# Patient Record
Sex: FEMALE
[De-identification: names, ages, dates, MRNs, and addresses within clinical notes are randomized per-mention and may not be internally consistent; named-entity substitution may affect disease eponyms.]

---

## 2022-11-14 ENCOUNTER — HOSPITAL ENCOUNTER (EMERGENCY)
Dept: HOSPITAL 97 - ER | Age: 52
Discharge: HOME | End: 2022-11-14
Payer: COMMERCIAL

## 2022-11-14 VITALS — TEMPERATURE: 98.4 F

## 2022-11-14 VITALS — OXYGEN SATURATION: 99 %

## 2022-11-14 VITALS — DIASTOLIC BLOOD PRESSURE: 75 MMHG | SYSTOLIC BLOOD PRESSURE: 109 MMHG

## 2022-11-14 DIAGNOSIS — R07.89: Primary | ICD-10-CM

## 2022-11-14 DIAGNOSIS — R07.0: ICD-10-CM

## 2022-11-14 LAB
BUN BLD-MCNC: 5 MG/DL (ref 7–18)
GLUCOSE SERPLBLD-MCNC: 100 MG/DL (ref 74–106)
HCT VFR BLD CALC: 38.4 % (ref 36–45)
LYMPHOCYTES # SPEC AUTO: 0.9 K/UL (ref 0.7–4.9)
MAGNESIUM SERPL-MCNC: 2.2 MG/DL (ref 1.8–2.4)
MCV RBC: 91.7 FL (ref 80–100)
PMV BLD: 6.6 FL (ref 7.6–11.3)
POTASSIUM SERPL-SCNC: 3.3 MMOL/L (ref 3.5–5.1)
RBC # BLD: 4.19 M/UL (ref 3.86–4.86)
TROPONIN I SERPL HS-MCNC: 3.9 PG/ML (ref ?–58.9)

## 2022-11-14 PROCEDURE — 85025 COMPLETE CBC W/AUTO DIFF WBC: CPT

## 2022-11-14 PROCEDURE — 71045 X-RAY EXAM CHEST 1 VIEW: CPT

## 2022-11-14 PROCEDURE — 36415 COLL VENOUS BLD VENIPUNCTURE: CPT

## 2022-11-14 PROCEDURE — 93005 ELECTROCARDIOGRAM TRACING: CPT

## 2022-11-14 PROCEDURE — 83735 ASSAY OF MAGNESIUM: CPT

## 2022-11-14 PROCEDURE — 99284 EMERGENCY DEPT VISIT MOD MDM: CPT

## 2022-11-14 PROCEDURE — 80048 BASIC METABOLIC PNL TOTAL CA: CPT

## 2022-11-14 PROCEDURE — 84484 ASSAY OF TROPONIN QUANT: CPT

## 2022-11-14 PROCEDURE — 85379 FIBRIN DEGRADATION QUANT: CPT

## 2022-11-14 PROCEDURE — 71275 CT ANGIOGRAPHY CHEST: CPT

## 2022-11-14 NOTE — RAD REPORT
EXAM DESCRIPTION:  CT - Chest For Pe Angio - 11/14/2022 7:30 am

 

CLINICAL HISTORY:  Chest pain, SOB;   [facility]

 

TECHNIQUE:  CT angiogram of the chest using intravenous contrast. MIP reconstructions were performed.


All CT scans at this facility use dose modulation, iterative reconstruction, and/or weight based dosi
ng when appropriate to reduce radiation dose to as low as reasonably achievable.

 

COMPARISON:  None.

 

FINDINGS:  PULMONARY ARTERIES:   The pulmonary arteries are adequately opacified to the subsegmental 
level.   No intraluminal filling defects are identified to suggest acute PE.

MEDIASTINUM:   The heart and great vessels appear unremarkable. There is no evidence for pericardial 
effusion. There is no pathologically enlarged adenopathy.

LUNGS:   There are linear bands of atelectasis at the bilateral lung bases. The lungs are otherwise c
lear bilaterally. No focal consolidation identified. No dominant pulmonary nodules or masses are iden
tified. No pleural effusion or pneumothorax.

VISUALIZED ABDOMEN:   The visualized solid organs of the abdomen reveal no acute process. The liver a
ppears low in density raising concern for fatty infiltration.

BONES AND SOFT TISSUES:   The soft tissues and osseous structures appear unremarkable.

 

IMPRESSION:  1.   No CT evidence of pulmonary embolism.   No other acute cardiopulmonary process iden
tified.

2.   Atelectatic changes are present at the bilateral lung bases.

3.   Low-density appearance of the liver is concerning for mild fatty infiltration.

 

Electronically signed by:   Rufino Mancuso MD   11/14/2022 6:29 AM CST Workstation: [id number]

 

 

 

 

Due to temporary technical issues with the PACS/Fluency reporting system, reports are being signed by
 the in house radiologists without review as a courtesy to insure prompt reporting. The interpreting 
radiologist is fully responsible for the content of the report.

## 2022-11-14 NOTE — EDPHYS
Physician Documentation                                                                           

 [facility]                                                                 

Name: Adriane Brady                                                                            

Age: 52 yrs                                                                                       

Sex: Female                                                                                       

: 1970                                                                                   

MRN: [id number]                                                                                   

Arrival Date: 2022                                                                          

Time: 04:08                                                                                       

Account#: [id number]                                                                            

Bed 5                                                                                             

Private MD:                                                                                       

ED Physician Kp Hawthorne                                                                         

HPI:                                                                                              

                                                                                             

07:37 This 52 yrs old  Female presents to ER via Ambulatory with complaints of Chest  ms3 

      Pain > 31 y/o.                                                                              

07:37 This 52 yrs old  Female presents to ER via Ambulatory with complaints of Chest  ms3 

      Pain > 33 y/o.                                                                              

07:37 52-year-old female with past medical history of lupus presents for throat pain and      ms3 

      fever that has been ongoing for 9 days. Patient states yesterday she developed chest        

      pain that is described as pressure and rated a 6/10. Patient denies alleviating or          

      inciting factors. Patient notes she has completed a course of amoxicillin for her sore      

      throat..                                                                                    

                                                                                                  

Historical:                                                                                       

- Allergies:                                                                                      

04:31 No Known Allergies;                                                                     kl  

- Home Meds:                                                                                      

04:31 hydroxychloroquine 200 mg oral tab 1 tab 2 times per day [Active]; prednisone 5 mg/mL   kl  

      Oral conc once daily [Active];                                                              

- PMHx:                                                                                           

04:32 Lupus erythematosus;                                                                    kl  

- PSHx:                                                                                           

04:32 None;                                                                                   kl  

                                                                                                  

- Immunization history:: Adult Immunizations not up to date.                                      

- Social history:: Smoking status: Patient denies any tobacco usage or history of.                

                                                                                                  

                                                                                                  

ROS:                                                                                              

07:37 Constitutional: Negative for fever, and chills.                                         ms3 

07:37 Cardiovascular: Negative for chest pain, and palpitations. Respiratory: Negative for        

      shortness of breath, cough, wheezing, and pleuritic chest pain, Abdomen/GI: Negative        

      for abdominal pain, nausea, vomiting, diarrhea, and constipation, MS/Extremity:             

      Negative for injury and deformity, Skin: Negative for injury, rash, and discoloration.      

07:37 ENT: Positive for sore throat.                                                              

07:37 All other systems are negative.                                                             

                                                                                                  

Exam:                                                                                             

04:21 ECG was reviewed by the Attending Physician.                                            ms3 

07:37 Constitutional:  This is a well developed, well nourished patient who is awake, alert,  ms3 

      and in no acute distress. Head/Face:  Normocephalic, atraumatic. Neck:  Trachea             

      midline, no cervical lymphadenopathy.  Supple, full range of motion without nuchal          

      rigidity, or vertebral point tenderness.  No Meningismus. Chest/axilla:  Normal chest       

      wall appearance and motion.  Nontender with no deformity.   Cardiovascular:  Regular        

      rate and rhythm with a normal S1 and S2.  No gallops, murmurs, or rubs.  Normal PMI, no     

      JVD.  No pulse deficits. Respiratory:  Lungs have equal breath sounds bilaterally,          

      clear to auscultation and percussion.  No rales, rhonchi or wheezes noted.  No              

      increased work of breathing, no retractions or nasal flaring. Abdomen/GI:  Soft,            

      non-tender, with normal bowel sounds.  No distension or tympany.  No guarding or            

      rebound.  No evidence of tenderness throughout.                                             

07:37 Skin:  Warm, dry with normal turgor.  Normal color with no rashes, no lesions, and no       

      evidence of cellulitis. MS/ Extremity:  Pulses equal, no cyanosis.  Neurovascular           

      intact.  Full, normal range of motion.                                                      

07:37 ENT: PND.                                                                                   

                                                                                                  

Vital Signs:                                                                                      

04:20 BP 96 / 77; Pulse 85; Resp 16; Temp 98.4(O); Pulse Ox 100% on R/A; Weight 77.11 kg;     kl  

      Height 5 ft. 2 in. (157.48 cm); Pain 2/10;                                                  

04:53  / 67; Pulse 83; Resp 16; Pulse Ox 99% on R/A;                                    kl  

05:39  / 70; Pulse 84; Resp 18; Pulse Ox 99% on R/A;                                    kl  

07:35  / 75; Pulse 81; Resp 18; Pulse Ox 99% on R/A;                                    ph  

04:20 Body Mass Index 31.09 (77.11 kg, 157.48 cm)                                             kl  

                                                                                                  

MDM:                                                                                              

04:18 Patient medically screened.                                                             ms3 

07:37 Differential diagnosis: abnormal EKG, acute myocardial infarction, anxiety, chest wall  ms3 

      pain, pulmonary embolus. HEART Score: History: Slightly Suspicious (0), ECG: Normal         

      (0), Age: > 45 and < 65 years (1), Risk Factors: No Risk Factors Known (0), Troponin: <     

      or = 1 x Normal Limit (0), Total Score = 1. Data reviewed: vital signs, nurses notes,       

      lab test result(s), EKG, radiologic studies, and as a result, I will discharge patient.     

      Special discussion: Based on the patient's history, exam, and Dx evaluation, there is       

      no indication for emergent intervention or inpatient Tx. It is understood by the            

      patient/guardian that if the Sx's persist or worsen they need to return immediately for     

      re-evaluation.                                                                              

                                                                                                  

                                                                                             

04:36 Order name: Basic Metabolic Panel; Complete Time: 05:28                                   

                                                                                             

04:36 Order name: CBC with Diff; Complete Time: 05:15                                           

                                                                                             

04:36 Order name: Troponin HS; Complete Time: 05:28                                             

                                                                                             

04:38 Order name: D-Dimer; Complete Time: 05:15                                                 

                                                                                             

04:54 Order name: Magnesium; Complete Time: 05:28                                             EDMS

                                                                                             

04:36 Order name: XRAY Chest (1 view)                                                           

                                                                                             

04:36 Order name: EKG; Complete Time: 04:37                                                     

                                                                                             

04:36 Order name: Cardiac monitoring; Complete Time: 04:36                                      

                                                                                             

04:36 Order name: EKG - Nurse/Tech; Complete Time: 04:36                                        

                                                                                             

04:36 Order name: IV Saline Lock; Complete Time: 04:36                                          

                                                                                             

04:36 Order name: Labs collected and sent; Complete Time: 04:51                                 

                                                                                             

04:36 Order name: O2 Per Protocol; Complete Time: 04:52                                         

                                                                                             

05:29 Order name: CT Chest For PE Angio                                                       ms3 

                                                                                             

04:36 Order name: O2 Sat Monitoring; Complete Time: 04:52                                       

                                                                                                  

EC:21 Rate is 87 beats/min. Rhythm is regular. QRS Axis is Normal. ND interval is normal. QRS ms3 

      interval is normal. Clinical impression: Normal ECG. Interpreted by me. Reviewed by me.     

                                                                                                  

Administered Medications:                                                                         

No medications were administered                                                                  

                                                                                                  

                                                                                                  

Disposition Summary:                                                                              

22 07:36                                                                                    

Discharge Ordered                                                                                 

      Location: Home                                                                          ms3 

      Condition: Stable                                                                       ms3 

      Diagnosis                                                                                   

        - Chest pain, unspecified                                                             ms3 

        - Pain in throat                                                                      ms3 

        - Shortness of breath                                                                 ms3 

      Followup:                                                                               ms3 

        - With: Diego Azar DO                                                                  

        - When: 1 - 2 days                                                                         

        - Reason: Re-evaluation by your physician                                                  

      Discharge Instructions:                                                                     

        - Discharge Summary Sheet                                                             ms3 

        - Nonspecific Chest Pain, Adult                                                       ms3 

        - Sore Throat                                                                         ms3 

      Forms:                                                                                      

        - Medication Reconciliation Form                                                      ms3 

        - Thank You Letter                                                                    ms3 

        - Work release form                                                                   tw2 

        - Antibiotic Education                                                                ms3 

        - Prescription Opioid Use                                                             ms3 

Signatures:                                                                                       

Dispatcher [employer]                           EDMS                                                 

Meka Burroughs RN                     RN   Kp Garcia,                         DO   ms3                                                  

                                                                                                  

Corrections: (The following items were deleted from the chart)                                    

04:54 04:39 MAGNESIUM+[id number] ordered. EDMS                                                 EDMS

                                                                                                  

**************************************************************************************************

## 2022-11-14 NOTE — ER
Nurse's Notes                                                                                     

 [facility]                                                                 

Name: Adriane Brady                                                                            

Age: 52 yrs                                                                                       

Sex: Female                                                                                       

: 1970                                                                                   

MRN: [id number]                                                                                   

Arrival Date: 2022                                                                          

Time: 04:08                                                                                       

Account#: [id number]                                                                            

Bed 5                                                                                             

Private MD:                                                                                       

Diagnosis: Chest pain, unspecified;Pain in throat;Shortness of breath                             

                                                                                                  

Presentation:                                                                                     

                                                                                             

04:20 Chief complaint: Patient states: chest pain off and on since last Friday cough and sore kl  

      throat x 9 days completed round of antibiotics. Coronavirus screen: Vaccine status:         

      Patient reports receiving the 2nd dose of the covid vaccine. Ebola Screen: Patient          

      negative for fever greater than or equal to 101.5 degrees Fahrenheit, and additional        

      compatible Ebola Virus Disease symptoms. Initial Sepsis Screen: Does the patient meet       

      any 2 criteria? No. Patient's initial sepsis screen is negative. Does the patient have      

      a suspected source of infection? No. Patient's initial sepsis screen is negative. Risk      

      Assessment: Do you want to hurt yourself or someone else? Patient reports no desire to      

      harm self or others.                                                                        

04:20 Method Of Arrival: Ambulatory                                                             

04:20 Acuity: VINNY 3                                                                           kl  

07:35 Onset of symptoms was 2022.                                                  

                                                                                                  

Triage Assessment:                                                                                

04:33 General: Appears in no apparent distress. comfortable, Behavior is calm, cooperative.   kl  

      Pain: Complains of pain in chest. EENT: No deficits noted. No signs and/or symptoms         

      were reported regarding the EENT system. Neuro: No deficits noted. Cardiovascular: No       

      deficits noted. Rhythm is sinus rhythm. Respiratory: No deficits noted. Airway is           

      patent Respiratory effort is even, unlabored, Respiratory pattern is regular,               

      symmetrical. GI: No deficits noted. No signs and/or symptoms were reported involving        

      the gastrointestinal system. : No deficits noted. No signs and/or symptoms were           

      reported regarding the genitourinary system. Derm: No deficits noted. No signs and/or       

      symptoms reported regarding the dermatologic system.                                        

                                                                                                  

Historical:                                                                                       

- Allergies:                                                                                      

04:31 No Known Allergies;                                                                     kl  

- Home Meds:                                                                                      

04:31 hydroxychloroquine 200 mg oral tab 1 tab 2 times per day [Active]; prednisone 5 mg/mL   kl  

      Oral conc once daily [Active];                                                              

- PMHx:                                                                                           

04:32 Lupus erythematosus;                                                                    kl  

- PSHx:                                                                                           

04:32 None;                                                                                   kl  

                                                                                                  

- Immunization history:: Adult Immunizations not up to date.                                      

- Social history:: Smoking status: Patient denies any tobacco usage or history of.                

                                                                                                  

                                                                                                  

Screenin:17 Abuse screen: Denies threats or abuse. Nutritional screening: No deficits noted.        bb  

      Tuberculosis screening: No symptoms or risk factors identified. Fall Risk None              

      identified.                                                                                 

                                                                                                  

Assessment:                                                                                       

04:34 Reassessment: see triage assessment.                                                    kl  

07:18 Reassessment: Patient appears in no apparent distress at this time. Patient is alert,   bb  

      oriented x 3, equal unlabored respirations, skin warm/dry/pink. Patient states symptoms     

      have improved. Pain: Denies pain.                                                           

                                                                                                  

Vital Signs:                                                                                      

04:20 BP 96 / 77; Pulse 85; Resp 16; Temp 98.4(O); Pulse Ox 100% on R/A; Weight 77.11 kg;     kl  

      Height 5 ft. 2 in. (157.48 cm); Pain 2/10;                                                  

04:53  / 67; Pulse 83; Resp 16; Pulse Ox 99% on R/A;                                    kl  

05:39  / 70; Pulse 84; Resp 18; Pulse Ox 99% on R/A;                                    kl  

07:35  / 75; Pulse 81; Resp 18; Pulse Ox 99% on R/A;                                    ph  

04:20 Body Mass Index 31.09 (77.11 kg, 157.48 cm)                                               

                                                                                                  

ED Course:                                                                                        

04:08 Patient arrived in ED.                                                                  bp1 

04:10 Kp Hawthorne DO is Attending Physician.                                                ms3 

04:31 Triage completed.                                                                       kl  

04:33 EKG completed in triage. Results shown to MD.                                           kl  

04:52 Basic Metabolic Panel Sent.                                                             kl  

04:52 CBC with Diff Sent.                                                                     kl  

05:26 XRAY Chest (1 view) In Process Unspecified.                                             EDMS

06:18 CT Chest For PE Angio In Process Unspecified.                                           EDMS

07:35 Claudia Gomez, RN is Primary Nurse.                                                    ph  

07:35 Diego Azar DO is Referral Physician.                                                ms3 

07:35 Arm band placed on.                                                                     ph  

07:35 Patient has correct armband on for positive identification. Placed in gown. Bed in low  ph  

      position. Call light in reach. Client placed on continuous cardiac and pulse oximetry       

      monitoring. NIBP monitoring applied.                                                        

07:35 No provider procedures requiring assistance completed. Patient maintains SpO2           ph  

      saturation greater than 95% on room air.                                                    

08:21 IV discontinued, intact, bleeding controlled, No redness/swelling at site. Pressure     tw2 

      dressing applied.                                                                           

                                                                                                  

Administered Medications:                                                                         

No medications were administered                                                                  

                                                                                                  

                                                                                                  

Medication:                                                                                       

07:18 VIS not applicable for this client.                                                     bb  

                                                                                                  

Outcome:                                                                                          

07:36 Discharge ordered by MD.                                                                ms3 

08:21 Discharged to home ambulatory, with significant other.                                  tw2 

08:21 Condition: stable                                                                           

08:21 Discharge instructions given to patient, significant other, Instructed on discharge         

      instructions, follow up and referral plans. Demonstrated understanding of instructions,     

      follow-up care.                                                                             

08:21 Patient left the ED.                                                                    tw2 

                                                                                                  

Signatures:                                                                                       

Dispatcher [employer]                           EDMS                                                 

Meka Burroughs RN                     RN   Katrina Levy RN                     RN   Claudia Watson RN RN ph Wise, Tara, RN                          RN   tw2                                                  

Kp Hawthorne DO DO   ms3                                                  

Nikole Heath                           bp1                                                  

                                                                                                  

**************************************************************************************************

## 2022-11-14 NOTE — XMS REPORT
Continuity of Care Document

                          Created on:2022



Patient:AMRY GARNETT

Sex:Female

:1970

External Reference #:[id number]





Demographics







                          Address                   [address] [place] [address]

 

                          Home Phone                [phone]

 

                          Email Address             NONE

 

                          Preferred Language        English

 

                          Marital Status            Unknown

 

                          Anabaptist Affiliation     Unknown

 

                          Race                      Unknown

 

                          Additional Race(s)        Unavailable



                                                    Unavailable

 

                          Ethnic Group              Unknown









Author







                          Organization              [facility]

t

 

                          Address                   [address] [place] [address] [place] [address]

 

                          Phone                     [phone]









Care Team Providers







                    Name                Role                Phone

 

                    [facility]     Attending Clinician Unavailable

 

                    Kimberly Macedo     Attending Clinician [phone]

 

                    Cindy      Attending Clinician Unavailable

 

                    Karlo Diehl   Attending Clinician [phone]

 

                    GERSON PEREZ M.D. Attending Clinician Unavailable

 

                    [facility]     Admitting Clinician Unavailable

 

                    Cindy      Admitting Clinician Unavailable









Payers







           Payer Name Policy Type Policy Number Effective Date Expiration Date S

sybil

 

           [place]            [id number] 2022 00:00:00         

   

 

           [place]            [id number] 2022 00:00:00         

   



           [place]                                               







Problems







       Condition Condition Condition Status Onset  Resolution Last   Treating Co

mments 

Source



       Name   Details Category        Date   Date   Treatment Clinician        



                                                 Date                 

 

       Hyperlipid Hyperlipid Problem Active                              [facility]

[facility]



       emia   emia                 2-                               Medical



                                   00:00:                             



                                   00                                 

 

       Systemic Systemic Problem Active                              [facility]

a



       lupus  Lupus                2-                               Medical



       erythemato Erythemato               00:00:                             



       guillermo    guillermo                  00                                 

 

       History of History of Problem Resolve                                    

[facility]



       Abnormal Abnormal        d                                         Physic

i



       liver  liver                                                   ans



       function function                                                  

 

       History of History of Problem Resolve                                    

[facility]



       hyperpigme hyperpigme        d                                         Ph

ysici



       ntation of ntation of                                                  an

s



       skin   skin                                                    

 

       High risk High risk Problem Active                                    [facility]



       medication medication                                                  Ph

ys[facility]



       use    use                                                     ans

 

       History of History of Problem Resolve                                    

[facility]



       Polyarthra Polyarthra        d                                         Ph

ysici



       lgia   lgia                                                    ans

 

       SLE    SLE    Problem Active                                    [facility]



       (systemic (systemic                                                  Phys

ici



       lupus  lupus                                                   ans



       erythemato erythemato                                                  



       guillermo)   guillermo)                                                    

 

       Hematuria Hematuria Problem Active                                    [facility]



                                                                      [facility]



                                                                      ans







Allergies, Adverse Reactions, Alerts

This patient has no known allergies or adverse reactions.



Social History







                Smoking Status  Start Date      Stop Date       Source

 

                Never Smoker                                    [facility]







Medications







       Ordered Filled Start  Stop   Current Ordering Indication Dosage Frequency

 Signature

                    Comments            Components          Source



     Medication Medication Date Date Medication? Clinician                (SIG) 

          



     Name Name                                                   

 

     predniSONE predniSONE 2019      Yes  GERSON                TAKE 1        

   [facility]



     5 MG Oral 5 MG Oral 2-12           AHMED M.D.                TABLET        

   [facility]



     Tablet Tablet 00:00:                               EVERY           ans



               00                                 MORNINg AS           



                                                  NEEDED FOR           



                                                  FLARE-UPS.           

 

     predniSONE predniSONE       Yes  GERSON                TAKE 4        

   [facility]



     1 MG Oral 1 MG Oral 7-05           AHMED M.D.                TABLETS BY    

       [facility]



     Tablet Tablet 00:00:                               MOUTH ONCE           ans



               00                                 DAILY           

 

     Hydroxychlo Hydroxychlo       Yes  GERSON                TAKE ONE    

       [facility]



     roquine roquine 7-05           AHMED M.D.                AND            Phy

sici



     Sulfate 200 Sulfate 200 00:00:                               ONE-HALF      

     ans



     MG Oral MG Oral 00                                 TABLET BY           



     Tablet Tablet                                    MOUTH           



                                                  DAILY           

 

     DULoxetine DULoxetine           Yes                                     [facility]



     HCl - 60 MG HCl - 60 MG                                                   P

hysici



     Oral Oral                                                   ans



     Capsule Capsule                                                   



     Delayed Delayed                                                   



     Release Release                                                   



     Particles Particles                                                   

 

     Furosemide Furosemide           Yes                                     UT



     20 MG Oral 20 MG Oral                                                   Phy

sici



     Tablet Tablet                                                   ans

 

     metFORMIN metFORMIN           Yes                                     UT



     HCl - 500 HCl - 500                                                   Physi

ci



     MG Oral MG Oral                                                   ans



     Tablet Tablet                                                   

 

     Lisinopril Lisinopril           Yes                                     UT



     20 MG Oral 20 MG Oral                                                   Phy

sici



     Tablet Tablet                                                   ans

 

     Simvastatin Simvastatin           Yes                                     U

T



     40 MG Oral 40 MG Oral                                                   Phy

sici



     Tablet Tablet                                                   ans

 

     Raloxifene Raloxifene           Yes                                     UT



     HCl - 60 MG HCl - 60 MG                                                   P

hysici



     Oral Tablet Oral Tablet                                                   a

ns

 

     Vitamin D3 Vitamin D3           Yes                                     [facility]



     25 MCG 25 MCG                                                   Phys[facility]



     (1000 UT) (1000 UT)                                                   ans



     Oral Tablet Oral Tablet                                                   

 

     ZyrTEC ZyrTEC           Yes                                     UT



     Allergy Allergy                                                   Phys[facility]



     CAPS CAPS                                                   ans

 

     Potassium Potassium           Yes                                     UT



     Citrate ER Citrate ER                                                   Phy

sici



     TBCR TBCR                                                   ans

 

     hydroxychlo hydroxychlo           No             1    Q1D  hydroxychl      

     [facility]



     roquine 200 roquine 200                                    oroquine        

   Medical



     mg tablet mg tablet                                    200 mg           



     Take 1 Take 1                                    tablet           



     tablet tablet                                    Take 1           



     every day every day                                    tablet           



     by oral by oral                                    every day           



     route. route.                                    by oral           



                                                  route.           

 

     prednisone prednisone           No             1    Q1D  prednisone        

   [facility]



     5 mg tablet 5 mg tablet                                    5 mg           M

edical



     Take 1 Take 1                                    tablet           



     tablet tablet                                    Take 1           



     every day every day                                    tablet           



     by oral by oral                                    every day           



     route. route.                                    by oral           



                                                  route.           

 

     hydroxychlo hydroxychlo           No             1    Q1D  hydroxychl      

     [facility]



     roquine 200 roquine 200                                    oroquine        

   Medical



     mg tablet mg tablet                                    200 mg           



     Take 1 Take 1                                    tablet           



     tablet tablet                                    Take 1           



     every day every day                                    tablet           



     by oral by oral                                    every day           



     route. route.                                    by oral           



                                                  route.           

 

     prednisone prednisone           No             1    Q1D  prednisone        

   [facility]



     5 mg tablet 5 mg tablet                                    5 mg           M

edical



     Take 1 Take 1                                    tablet           



     tablet tablet                                    Take 1           



     every day every day                                    tablet           



     by oral by oral                                    every day           



     route. route.                                    by oral           



                                                  route.           

 

     hydroxychlo hydroxychlo           No             1    BID  hydroxychl      

     [facility]



     roquine 200 roquine 200                                    oroquine        

   Medical



     mg tablet mg tablet                                    200 mg           



     Take 1 Take 1                                    tablet           



     tablet tablet                                    Take 1           



     twice a day twice a day                                    tablet          

 



     by oral by oral                                    twice a           



     route for route for                                    day by           



     90 days. 90 days.                                    oral route           



                                                  for 90           



                                                  days.           

 

     prednisone prednisone           No                       prednisone        

   [facility]



     5 mg tablet 5 mg tablet                                    5 mg           M

edical



     Take 2 tabs Take 2 tabs                                    tablet          

 



     PO x 30 PO x 30                                    Take 2           



     days then 1 days then 1                                    tabs PO x       

    



     PO QD PO QD                                    30 days           



                                                  then 1 PO           



                                                  QD             

 

     Advil 200 Advil 200           No             2    Q6H  Advil 200           

[facility]



     mg tablet mg tablet                                    mg tablet           

Medical



     Take 2 Take 2                                    Take 2           



     tablets tablets                                    tablets           



     every 6 every 6                                    every 6           



     hours by hours by                                    hours by           



     oral route oral route                                    oral route        

   



     as   as                                      as             



     directed. directed.                                    directed.           

 

     amoxicillin amoxicillin           No             1capsul Q8H  amoxicilli   

        [facility]



     500 mg 500 mg                          e(s)      n 500 mg           Medical



     capsule capsule                                    capsule           



     Take 1 Take 1                                    Take 1           



     capsule capsule                                    capsule           



     every 8 every 8                                    every 8           



     hours by hours by                                    hours by           



     oral route. oral route.                                    oral           



                                                  route.           

 

     hydroxychlo hydroxychlo           No                       hydroxychl      

     [facility]



     roquine 200 roquine 200                                    oroquine        

   Medical



     mg tablet mg tablet                                    200 mg           



     Take 1 Take 1                                    tablet           



     tablet by tablet by                                    Take 1           



     mouth once mouth once                                    tablet by         

  



     daily daily                                    mouth once           



                                                  daily           

 

     naproxen naproxen           No             1    BID  naproxen           [facility]

via



     500 mg 500 mg                                    500 mg           Medical



     tablet Take tablet Take                                    tablet          

 



     1 tablet 1 tablet                                    Take 1           



     twice a day twice a day                                    tablet          

 



     by oral by oral                                    twice a           



     route as route as                                    day by           



     needed. needed.                                    oral route           



                                                  as needed.           

 

     prednisone prednisone           No                       prednisone        

   [facility]



     5 mg tablet 5 mg tablet                                    5 mg           M

edical



     1 PO QD 1 PO QD                                    tablet 1           



                                                  PO QD           







Immunizations







           Ordered Immunization Filled Immunization Date       Status     Commen

ts   Source



           Name       Name                                        

 

           COVID-19, mRNA, COVID-19, mRNA, 2021 Completed             [facility]



           LNP-S, PF, 50 LNP-S, PF, 50 00:00:00                         



           mcg/0.5 mL dose mcg/0.5 mL dose                                  



           (Moderna)  (Moderna)                                   

 

           COVID-19, mRNA, COVID-19, mRNA, 2021 Completed             [facility]

[facility]



           LNP-S, PF, 50 LNP-S, PF, 50 00:00:00                         



           mcg/0.5 mL dose mcg/0.5 mL dose                                  



           (Moderna)  (Moderna)                                   

 

           COVID-19, mRNA, COVID-19, mRNA, 2021 Completed             [facility]

[facility]



           LNP-S, PF, 50 LNP-S, PF, 50 00:00:00                         



           mcg/0.5 mL dose mcg/0.5 mL dose                                  



           (Moderna)  (Moderna)                                   

 

           COVID-19, mRNA, COVID-19, mRNA, 2021 Completed             [facility]



           LNP-S, PF, 50 LNP-S, PF, 50 00:00:00                         



           mcg/0.5 mL dose mcg/0.5 mL dose                                  



           (Moderna)  (Moderna)                                   

 

           COVID-19, mRNA, COVID-19, mRNA, 2021 Completed             [facility]



           LNP-S, PF, 50 LNP-S, PF, 50 00:00:00                         



           mcg/0.5 mL dose mcg/0.5 mL dose                                  



           (Moderna)  (Moderna)                                   

 

           COVID-19, mRNA, COVID-19, mRNA, 2021 Completed             [facility]



           LNP-S, PF, 50 LNP-S, PF, 50 00:00:00                         



           mcg/0.5 mL dose mcg/0.5 mL dose                                  



           (Moderna)  (Moderna)                                   

 

           tetanus toxoid, tetanus toxoid, 2014 Completed             [facility]

[facility]



           unspecified unspecified 00:00:00                         



           formulation formulation                                  

 

           tetanus toxoid, tetanus toxoid, 2014 Completed             [facility]

[facility]



           unspecified unspecified 00:00:00                         



           formulation formulation                                  

 

           tetanus toxoid, tetanus toxoid, 2014 Completed             [facility]

[facility]



           unspecified unspecified 00:00:00                         



           formulation formulation                                  

 

           tetanus toxoid, tetanus toxoid, 2014 Completed             [facility]

[facility]



           unspecified unspecified 00:00:00                         



           formulation formulation                                  







Vital Signs







             Vital Name   Observation Time Observation Value Comments     Source

 

             BP Diastolic 2022 00:00:00 86 mm[Hg]                 [facility]

[facility]

 

             Height       2022 00:00:00 63 [in_i]                 [facility]

[facility]

 

             BMI (Body Mass 2022 00:00:00 28.2 kg/m2                [facility]

 [facility]



             Index)                                              

 

             BP Systolic  2022 00:00:00 123 mm[Hg]                [facility]

[facility]

 

             Body Weight  2022 00:00:00 2544 [oz_av]              [facility]

[facility]

 

             BP Diastolic 2022-08-15 00:00:00 84 mm[Hg]                 [facility]

 

             Height       2022-08-15 00:00:00 63 [in_i]                 [facility]

[facility]

 

             BMI (Body Mass 2022-08-15 00:00:00 28.7 kg/m2                [facility]

 Medical



             Index)                                              

 

             BP Systolic  2022-08-15 00:00:00 123 mm[Hg]                [facility]

 

             Body Weight  2022-08-15 00:00:00 2596 [oz_av]              [facility]

 

             BP Diastolic 2022 00:00:00 92 mm[Hg]                 [facility]

 

             Height       2022 00:00:00 63 [in_i]                 [facility]

 

             BMI (Body Mass 2022 00:00:00 28.6 kg/m2                [facility]

 Medical



             Index)                                              

 

             BP Systolic  2022 00:00:00 132 mm[Hg]                [facility]

 

             Body Weight  2022 00:00:00 2582 [oz_av]              [facility]

 

             BP Diastolic 2022 00:00:00 80 mm[Hg]                 [facility]

 

             Height       2022 00:00:00 63 [in_i]                 [facility]

 

             BMI (Body Mass 2022 00:00:00 28.3 kg/m2                [facility]

 Medical



             Index)                                              

 

             BP Systolic  2022 00:00:00 125 mm[Hg]                [facility]

 

             Body Weight  2022 00:00:00 2560 [oz_av]              [facility]

 

             BP Systolic  2019 15:29:00 122 mm[Hg]                [facility]

 

             BP Diastolic 2019 15:29:00 85 mm[Hg]                 [facility]

 

             Height       2019 15:29:00 62 [in_us]                [facility]

 

             Weight       2019 15:29:00 155 [lb_av]               [facility]

 

             Body Mass Index 2019 15:29:00 28.35 kg/m2               [facility]

[facility]



             Calculated                                          

 

             Heart Rate   2019 15:29:00 69 /min                   [facility]

 

             BP Systolic  2019 15:07:00 120 mm[Hg]   [facility]



                                                    Position:    



                                                    Sitting      

 

             BP Diastolic 2019 15:07:00 88 mm[Hg]    [facility]



                                                    Position:    



                                                    Sitting      







Procedures







                Procedure       Date / Time Performed Performing Clinician [facility]

e

 

                ULTRASOUND, ABDOMINAL, REAL 2022 00:00:00                 

[facility]



                TIME WITH IMAGE                                 



                DOCUMENTATION; COMPLETE                                 

 

                MAMMO, screening, digital, 2022 00:00:00                 [facility]

[facility]



                bilateral                                       

 

                [QLH] URINALYSIS, COMPLETE 2019 00:00:00                 [facility]

 

                [QLH] HEPATIC FUNCTION 2019 00:00:00                 [facility]



                PANEL                                           

 

                [QLH] CREATININE W/EGFR 2019 00:00:00                 [facility]

 

                [QL[facility]] C-REACTIVE PROTEIN 2019 00:00:00                 [facility]

 

                [[facility]] CBC (INCLUDES 2019 00:00:00                 [facility]

[facility]



                DIFF/PLT)                                       

 

                [QLH] UREA NITROGEN (BUN) 2019 00:00:00                 [facility]

 [facility]

 

                [QLH] SED RATE BY MODIFIED 2019 00:00:00                 [facility]

[facility]



                WESTERGREN                                      

 

                [QLH] HEPATIC FUNCTION 2019 00:00:00                 [facility]

[facility]



                PANEL                                           

 

                [QLH] URINALYSIS, COMPLETE 2019 00:00:00                 [facility]



                W/REFLEX TO CULTURE                                 

 

                [QLH] C-REACTIVE PROTEIN 2019 00:00:00                 [facility] 

[facility]

 

                [QLH] SED RATE BY MODIFIED 2019 00:00:00                 [facility]

[facility]



                WESTERGREN                                      

 

                [QLH] DNA (DS) ANTIBODY 2019 00:00:00                 [facility]

[facility]

 

                [QLH] CBC (INCLUDES 2019 00:00:00                 [facility]

[facility]



                DIFF/PLT)                                       

 

                [QLH] COMPLEMENT COMPONENT 2019 00:00:00                 [facility]

[facility]



                C3C                                             

 

                [QLH] COMPLEMENT COMPONENT 2019 00:00:00                 [facility]

[facility]



                C4C                                             

 

                [QLH] CREATININE W/EGFR 2019 00:00:00                 [facility]

[facility]

 

                [QLH] CBC (INCLUDES 2019 00:00:00                 [facility]

[facility]



                DIFF/PLT)                                       

 

                [QLH] C-REACTIVE PROTEIN 2019 00:00:00                 [facility] 

[facility]

 

                [QLH] HEPATIC FUNCTION 2019 00:00:00                 [facility]

[facility]



                PANEL                                           

 

                [QLH] URINALYSIS, COMPLETE 2019 00:00:00                 [facility]

[facility]

 

                [QLH] DNA (DS) ANTIBODY 2019 00:00:00                 [facility]

[facility]

 

                [QLH] COMPLEMENT COMPONENT 2019 00:00:00                 [facility]

[facility]



                C3C                                             

 

                [QLH] COMPLEMENT COMPONENT 2019 00:00:00                 [facility]

[facility]



                C4C                                             

 

                [QLH] CREATININE W/EGFR 2019 00:00:00                 [facility]

[facility]

 

                [QLH] SED RATE BY MODIFIED 2019 00:00:00                 [facility]

[facility]



                CNADY                                      







Plan of Care







             Planned Activity Planned Date Details      Comments     Source

 

             Diagnostic Test 2022   TSH, serum or plasma              [facility]



             Pending      00:00:00     [code = TSH, serum or              



                                       plasma]                   

 

             Diagnostic Test 2022   T4, free, serum [code              [facility]



             Pending      00:00:00     = T4, free, serum]              

 

             Diagnostic Test 2022   T3, free, serum or              [facility]



             Pending      00:00:00     plasma [code = T3,              



                                       free, serum or              



                                       plasma]                   

 

             Diagnostic Test 2020   [QLH] HEPATIC              [facility]

[facility]



             Pending      00:00:00     FUNCTION PANEL [code              



                                       = [QLH] HEPATIC              



                                       FUNCTION PANEL]              

 

             Diagnostic Test 2020   [QLH] CREATININE              [facility]

[facility]



             Pending      00:00:00     W/EGFR [code = [QLH]              



                                       CREATININE W/EGFR]              

 

             Diagnostic Test 2020   [QLH] C-REACTIVE              [facility]

[facility]



             Pending      00:00:00     PROTEIN [code = [QLH]              



                                       C-REACTIVE PROTEIN]              

 

             Diagnostic Test 2020   [QLH] CBC (INCLUDES              [facility]

[facility]



             Pending      00:00:00     DIFF/PLT) [code =              



                                       [QLH] CBC (INCLUDES              



                                       DIFF/PLT)]                

 

             Diagnostic Test 2020   [QLH] UREA NITROGEN              [facility]

[facility]



             Pending      00:00:00     (BUN) [code = [QLH]              



                                       UREA NITROGEN (BUN)]              

 

             Diagnostic Test 2020   [QLH] SED RATE BY              [facility]

[facility]



             Pending      00:00:00     MODIFIED WESTERGREN              



                                       [code = [QLH] SED              



                                       RATE BY MODIFIED              



                                       WESTERGREN]               

 

             Diagnostic Test 2019-10-19   [QLH] HEPATIC              [facility]

[facility]



             Pending      00:00:00     FUNCTION PANEL [code              



                                       = [QLH] HEPATIC              



                                       FUNCTION PANEL]              

 

             Diagnostic Test 2019-10-19   [QLH] URINALYSIS,              [facility]

[facility]



             Pending      00:00:00     COMPLETE W/REFLEX TO              



                                       CULTURE [code = [QLH]              



                                       URINALYSIS, COMPLETE              



                                       W/REFLEX TO CULTURE]              

 

             Diagnostic Test 2019-10-19   [QLH] C-REACTIVE              [facility]

[facility]



             Pending      00:00:00     PROTEIN [code = [QLH]              



                                       C-REACTIVE PROTEIN]              

 

             Diagnostic Test 2019-10-19   [QLH] SED RATE BY              [facility]

[facility]



             Pending      00:00:00     MODIFIED WESTERGREN              



                                       [code = [QLH] SED              



                                       RATE BY MODIFIED              



                                       WESTERGREN]               

 

             Diagnostic Test 2019-10-19   [QLH] DNA (DS)              [facility]

[facility]



             Pending      00:00:00     ANTIBODY [code =              



                                       [QLH] DNA (DS)              



                                       ANTIBODY]                 

 

             Diagnostic Test 2019-10-19   [QLH] CBC (INCLUDES              [facility]

[facility]



             Pending      00:00:00     DIFF/PLT) [code =              



                                       [QLH] CBC (INCLUDES              



                                       DIFF/PLT)]                

 

             Diagnostic Test 2019-10-19   [QLH] COMPLEMENT              [facility]

[facility]



             Pending      00:00:00     COMPONENT C3C [code =              



                                       [QLH] COMPLEMENT              



                                       COMPONENT C3C]              

 

             Diagnostic Test 2019-10-19   [QLH] COMPLEMENT              [facility]

[facility]



             Pending      00:00:00     COMPONENT C4C [code =              



                                       [QLH] COMPLEMENT              



                                       COMPONENT C4C]              

 

             Diagnostic Test 2019-10-19   [QLH] CREATININE              [facility]

[facility]



             Pending      00:00:00     W/EGFR [code = [[facility]]              



                                       CREATININE W/EGFR]              







Encounters







        Start   End     Encounter Admission Attending Care    Care    Encounter 

Source



        Date/Time Date/Time Type    Type    Clinicians Facility Department ID   

   

 

        2022-10-10         Outpatient                 [facility]     [id number]

 [place]



        08:13:53                                                 [id number]  [facility]

 

        2022 Outpatient         [facility] PRIV    PRIV    220

[id number] [facility]



        00:00:00 00:00:00                 k-In                    [id number] [facility]

l

 

        2022 Outpatient         [facility] PRIV    PRIV    220

[id number] [facility]



        00:00:00 00:00:00                 k-In                    [id number] [facility]

l

 

        2022 Karlo                 PRIV    [facility] [id number]

07 [facility]



        00:00:00 00:00:00 Shanita                         [facility]

ica



                        NP: [id number]                         [facility]         



                        [facility]          



                        [address],                             Office*         



                        [place], [place]                                              



                        [address]                                         



                        , Ph.                                           



                        [phone]                                         

 

        2022 Outpatient         [facility] PRIV    PRIV    220

[id number] [facility]



        00:00:00 00:00:00                 k-In                    [id number] Med[facility]

l

 

        2022-08-15 2022-08-15 Outpatient         [facility] PRIV    PRIV    220

[id number] [facility]



        00:00:00 00:00:00                 k-In                    [id number] [facility]

l

 

        2022-08-15 2022-08-15 Outpatient         Macedo, PRIV    PRIV    [id number]

[id number] 



        00:00:00 00:00:00                 Kimberly                  [facility]-11ed-b 



                                                                [id number] 



                                                                [id number]  

 

        2022-08-15 2022-08-15 Kimberly                  PRIV    [facility] [id number]

15 [facility]



        00:00:00 00:00:00 Macedo,                         Health -         Medi

pat



                        NP: [id number]                         [facility]         



                        [facility]          



                        [address],                             Office*         



                        [place], [place]                                              



                        [address]                                         



                        , Ph.                                           



                        [phone]                                         

 

        2022 Outpatient         Amelia_Mehreen MENDIOLA   MEHOP   [id number]

[id number] [place]



        00:00:00 00:00:00                 bin                     [id number]   da



                                                                        [facility]



                                                                        [facility]



                                                                        [facility]



                                                                        Outreac



                                                                        h



                                                                        Program

 

        2022 Outpatient         [place] [facility]   [facility]   119

7 [place]



        11:17:00 11:17:00                 bin                     [id number]   da



                                                                        [facility]



                                                                        al



                                                                        Health



                                                                        Outreac



                                                                        h



                                                                        Program

 

        2022 Outpatient         [facility] PRIV    PRIV    220

[id number] [facility]



        00:00:00 00:00:00                 k-In                    [id number] Med[facility]

l

 

        2022 [facility]  [facility]



        00:00:00 00:00:00 [facility]         Med

ical



                        NP: [id number]                         GC[facility]CPCN_[facility]          



                        36,                             Office*         



                        [place], [place]                                              



                        [address]                                         



                        , Ph.                                           



                        [phone]                                           



                        [phone]                                         

 

        2022 Outpatient         [facility]    [id number]

[id number] 



        00:00:00 00:00:00                 Karlo                 [id number] 



                                                                [id number] 



                                                                [id number]  

 

        2022 Outpatient         [facility] PRIV    PRIV    220

[id number] [facility]



        10:38:00 10:38:00                 k-In                    [id number] Med[facility]

l

 

        2022 [facility]  [facility]



        00:00:00 00:00:00 [facility]         Med

ical



                        NP: [id number]                         GC[facility]CPCN[facility]          



                        36,                             Office*         



                        [place], [place]                                              



                        [address]                                         



                        , Ph.                                           



                        [phone]                                         

 

        2022 Outpatient         [facility]    PRIV    [id number]

[id number] 



        00:00:00 00:00:00                 Karlo                 [id number] 



                                                                [id number] 



                                                                [id number]  

 

        [facility],  [facility]     Orthopedics 600

[address] [place]



        15:15:00 15:15:00 t; GERSON PEREZ,         - Sugar         ALBERT Henriquez 2 POD         yosi PRICE                            2               

 

        2019 Appointmen         JOHANA PEREZ     Multispecia 584

[address] [place]



        16:00:00 16:00:00 t; GERSON PEREZ lty ALBERT Monteiro M.D.                                            

 

        2019 Appointmen         ANA  [facility]     [id number]

3 [place]



        11:45:00 11:45:00 t; GERSON PEREZ Phys ici FAYYAZ, M.D. ans M.D.                                            

 

        2019 Appointmen         JOHANA PEREZ     Orthopedics 545

[address] [place]



        12:00:00 12:00:00 t; GERSON PEREZ - Sugar         ALBERT Henriquez 2          yosi PRICE                                            

 

        2019 Appointmen         ANA  [facility]     [id number]

9 [place]



        15:15:00 15:15:00 t; GERSON PEREZ,         Orthopedic         P

hysidinorah NIETO M.D.            Surgery -         yosi PRICE                            Antonio         



                                                        Trace 2         

 

        2018 Appointmen         ANA  [facility]     [id number]

9 [place]



        15:00:00 15:00:00 t; GERSON PEREZ Phys ici FAYYAZ, M.D. ans M.D.                                            

 

        2018 Appointmen         ANA  [facility]     [id number]

7 [place]



        15:00:00 15:00:00 t; GERSON PEREZ Phys ici FAYYAZ, M.D. ans M.D.                                            

 

        2018 Appointmen         ANA  [facility]     [id number]

4 [place]



        15:00:00 15:00:00 t; GERSON PEREZ Phys ici FAYYAZ, M.D. ans M.D.                                            

 

        2018 Appointmen         JOHANA PEREZ     [facility]     [id number]

8 [place]



        15:45:00 15:45:00 t; GERSON PEREZ Phys ici FAYYAZ, M.D. ans M.D.                                            

 

        2018 [facility]oint[facility]OHANA Ambriz     [facility]     [id number]

4 [place]



        16:00:00 16:00:00 t; GERSON PEREZ Phys ici FAYYAZ, M.D. ans M.D.                                            

 

        2017 [facility]oint[facility]OHANA Ambriz     [facility]     [id number]

1 [place]



        15:15:00 15:15:00 t; GERSON PEREZ Phys ici FAYYAZ, M.D. ans M.D.                                            

 

        2017-10-06 2017-10-06 AppointJOHANA Ambriz     [facility]     [id number]

9 [place]



        16:00:00 16:00:00 t; GERSON PEREZ Phys ici FAYYAZ, M.D. ans M.D.                                            

 

        2017 Appoint[facility]OHANA Ambriz     [facility]     [id number]

0 [place]



        08:00:00 08:00:00 t; GERSON PEREZ Phys ici FAYYAZ, M.D. ans M.D.                                            







Results







           Test Description Test Time  Test Comments Results    Result Comments 

Source









                    CBC W Auto Differential panel - Blood 2022 00:00:00 









                      Test Item  Value      Reference Range Interpretation Comme

nts









             Leukocytes [#/volume] in Blood by Automated count (test 7.5 x10e3/u

L 3.4-10.8                  



             code = 6690-2)                                        

 

             Erythrocytes [#/volume] in Blood by Automated count 4.54 x10e6/uL 3

.77-5.28                 



             (test code = 789-8)                                        

 

             Hemoglobin [Mass/volume] in Blood (test code = 718-7) 13.8 g/dL    

11.1-15.9                 

 

             Hematocrit [Volume Fraction] of Blood by Automated count 42.6 %    

   34.0-46.6                 



             (test code = 4544-3)                                        

 

             Erythrocyte mean corpuscular volume [Entitic volume] by 94 fL      

  79-97                     



             Automated count (test code = 787-2)                                

        

 

             Erythrocyte mean corpuscular hemoglobin [Entitic mass] 30.4 pg     

 26.6-33.0                 



             by Automated count (test code = 785-6)                             

           

 

             Erythrocyte mean corpuscular hemoglobin concentration 32.4 g/dL    

31.5-35.7                 



             [Mass/volume] by Automated count (test code = 786-4)               

                         

 

             Erythrocyte distribution width [Ratio] by Automated 12.3 %       11

.7-15.4                 



             count (test code = 788-0)                                        

 

             Platelets [#/volume] in Blood by Automated count (test 267 x10e3/uL

 150-450                   



             code = 777-3)                                        

 

             Neutrophils/100 leukocytes in Blood by Automated count 65 %        

 not estab.                



             (test code = 770-8)                                        

 

             Lymphocytes/100 leukocytes in Blood by Automated count 31 %        

 not estab.                



             (test code = 736-9)                                        

 

             Monocytes/100 leukocytes in Blood by Automated count 3 %          n

ot estab.                



             (test code = 5905-5)                                        

 

             Eosinophils/100 leukocytes in Blood by Automated count 1 %         

 not estab.                



             (test code = 713-8)                                        

 

             Basophils/100 leukocytes in Blood by Automated count 0 %          n

ot estab.                



             (test code = 706-2)                                        

 

             immature cells (test code = immature cells) np                     

                

 

             Neutrophils [#/volume] in Blood by Automated count (test 4.8 x10e3/

uL 1.4-7.0                   



             code = 751-8)                                        

 

             Lymphocytes [#/volume] in Blood by Automated count (test 2.3 x10e3/

uL 0.7-3.1                   



             code = 731-0)                                        

 

             Monocytes [#/volume] in Blood by Automated count (test 0.2 x10e3/uL

 0.1-0.9                   



             code = 742-7)                                        

 

             Eosinophils [#/volume] in Blood by Automated count (test 0.1 x10e3/

uL 0.0-0.4                   



             code = 711-2)                                        

 

             Basophils [#/volume] in Blood by Automated count (test 0.0 x10e3/uL

 0.0-0.2                   



             code = 704-7)                                        

 

             Immature granulocytes/100 leukocytes in Blood by 0 %          not e

stab.                



             Automated count (test code = 65966-5)                              

          

 

             Immature granulocytes [#/volume] in Blood by Automated 0.0 x10e3/uL

 0.0-0.1                   



             count (test code = 92666-5)                                        

 

             Nucleated erythrocytes/100 leukocytes [Ratio] in Blood np          

                           



             by Automated count (test code = 64451-2)                           

             

 

             Morphology [Interpretation] in Blood Narrative (test np            

                         



             code = 90880-9)                                        



[facility] - Serum or [id number] 
00:00:00





             Test Item    Value        Reference Range Interpretation Comments

 

             Glucose [Mass/volume] in 102 mg/dL    65-99        H            



             Serum or Plasma (test code =                                       

 



             2345-7)                                             

 

             Urea nitrogen [Mass/volume] 11 mg/dL     6-24                      



             in Serum or Plasma (test code                                      

  



             = 3094-0)                                           

 

             Creatinine [Mass/volume] in 0.61 mg/dL   0.57-1.00                 



             Serum or Plasma (test code =                                       

 



             2160-0)                                             

 

             Glomerular filtration 107 mL/min/1.73  >59                      



             rate/1.73 sq M.predicted                                        



             among non-blacks [Volume                                        



             Rate/Area] in Serum, Plasma                                        



             or Blood by Creatinine-based                                       

 



             formula (CKD-EPI) (test code                                       

 



             = 44782-8)                                          

 

             Glomerular filtration 123 mL/min/1.73  >59                      



             rate/1.73 sq M.predicted                                        



             among blacks [Volume                                        



             Rate/Area] in Serum, Plasma                                        



             or Blood by Creatinine-based                                       

 



             formula (CKD-EPI) (test code                                       

 



             = 13316-2)                                          

 

             Urea nitrogen/Creatinine 18           9-23                      



             [Mass Ratio] in Serum or                                        



             Plasma (test code = 3097-3)                                        

 

             Sodium [Moles/volume] in 138 mmol/L   134-144                   



             Serum or Plasma (test code =                                       

 



             2951-2)                                             

 

             Potassium [Moles/volume] in 4.6 mmol/L   3.5-5.2                   



             Serum or Plasma (test code =                                       

 



             2823-3)                                             

 

             Chloride [Moles/volume] in 104 mmol/L                       



             Serum or Plasma (test code =                                       

 



             5-0)                                             

 

             Carbon dioxide, total 17 mmol/L    20-29        L            



             [Moles/volume] in Serum or                                        



             Plasma (test code = -9)                                        

 

             Calcium [Mass/volume] in 9.0 mg/dL    8.7-10.2                  



             Serum or Plasma (test code =                                       

 



             00870-0)                                            

 

             Protein [Mass/volume] in 7.6 g/dL     6.0-8.5                   



             Serum or Plasma (test code =                                       

 



             2885-2)                                             

 

             Albumin [Mass/volume] in 4.1 g/dL     3.8-4.8                   



             Serum or Plasma (test code =                                       

 



             1751-7)                                             

 

             Globulin [Mass/volume] in 3.5 g/dL     1.5-4.5                   



             Serum by calculation (test                                        



             code = 97165-7)                                        

 

             Albumin/Globulin [Mass Ratio] 1.2          1.2-2.2                 

  



             in Serum or Plasma (test code                                      

  



             = 1759-0)                                           

 

             Bilirubin.total [Mass/volume] 0.2 mg/dL    0.0-1.2                 

  



             in Serum or Plasma (test code                                      

  



             = 1975-2)                                           

 

             Alkaline phosphatase 84 IU/L                          



             [Enzymatic activity/volume]                                        



             in Serum or Plasma (test code                                      

  



             = 6768-6)                                           

 

             Aspartate aminotransferase 45 IU/L      0-40         H            



             [Enzymatic activity/volume]                                        



             in Serum or Plasma (test code                                      

  



             = 1920-8)                                           

 

             Alanine aminotransferase 61 IU/L      0-32         H            



             [Enzymatic activity/volume]                                        



             in Serum or Plasma (test code                                      

  



             = 1742-6)                                           



[facility] MedicalUrinalysis [facility] (dipstick) panel - [id number] 00:00:00





             Test Item    Value        Reference Range Interpretation Comments

 

             Specific gravity of Urine by Test 1.008        1.005-1.030         

      



             strip (test code = 5811-5)                                        

 

             pH of Urine by Test strip (test 6.0          5.0-7.5               

    



             code = 5803-2)                                        

 

             Color of Urine (test code = 5778-6) yellow       yellow            

        

 

             Appearance of Urine (test code = clear        clear                

     



             5767-9)                                             

 

             Leukocyte esterase [Presence] in negative     negative             

     



             Urine by Test strip (test code =                                   

     



             5799-2)                                             

 

             Protein [Presence] in Urine by Test negative     negative/trace    

          



             strip (test code = 79782-6)                                        

 

             Glucose [Presence] in Urine by Test negative     negative          

        



             strip (test code = 85306-6)                                        

 

             Ketones [Presence] in Urine by Test negative     negative          

        



             strip (test code = 2514-8)                                        

 

             Hemoglobin [Presence] in Urine by negative     negative            

      



             Test strip (test code = 5794-3)                                    

    

 

             Bilirubin.total [Presence] in Urine negative     negative          

        



             by Test strip (test code = 5770-3)                                 

       

 

             Urobilinogen [Mass/volume] in Urine 0.2 mg/dL    0.2-1.0           

        



             by Test strip (test code = 26984-4)                                

        

 

             Nitrite [Presence] in Urine by Test negative     negative          

        



             strip (test code = 5802-4)                                        

 

             Microscopic observation comment                                



             [Identifier] in Urine sediment by                                  

      



             Light microscopy (test code =                                      

  



             15030-2)                                            



[facility] [id number] [facility]:00:00





             Test Item    Value        Reference Range Interpretation Comments

 

             Cholesterol [Mass/volume] in Serum 196 mg/dL    100-199            

       



             or Plasma (test code = 2093-3)                                     

   

 

             Triglyceride [Mass/volume] in Serum 85 mg/dL     0-149             

        



             or Plasma (test code = 2571-8)                                     

   

 

             Cholesterol in HDL [Mass/volume] in 81 mg/dL     >39               

        



             Serum or Plasma (test code =                                       

 



             2085-9)                                             

 

             Cholesterol in VLDL [Mass/volume] 15 mg/dL     5-40                

      



             in Serum or Plasma by calculation                                  

      



             (test code = 93564-1)                                        

 

             Cholesterol in LDL [Mass/volume] in 100 mg/dL    0-99         H    

        



             Serum or Plasma by calculation                                     

   



             (test code = 83877-3)                                        

 

             Laboratory comment [Text] in Report np                             

        



             Narrative (test code = 28322-8)                                    

    



[facility] Ratio[facility] in [id number] 00:00:00





             Test Item    Value        Reference Range Interpretation Comments

 

             Creatinine [Mass/volume] in Urine 27.8 mg/dL   not estab.          

      



             (test code = 2161-8)                                        

 

             Microalbumin [Mass/volume] in <3.0         not estab.              

  



             Urine (test code = 15062-9)                                        

 

             Albumin/Creatinine [Mass ratio] in <11          0-29               

       



             Urine (test code = 9318-7)                                        



[facility]Hemoglobin.total in [id number] 00:00:00





             Test Item    Value        Reference Range Interpretation Comments

 

             Hemoglobin A1c/Hemoglobin.total in 5.7 %        4.8-5.6      H     

       



             Blood (test code = 4548-4)                                        



[facility]] CBC (INCLUDES DIFF/PLT)2019 13:35:00





             Test Item    Value        Reference Range Interpretation Comments

 

             WBC (test code = 6690-2) 8.2 {x10E3/uL} 3.4-10.8                  

 

             RBC (test code = 789-8) 4.09 {x10E6/uL} 3.77-5.28                 

 

             Hemoglobin (test code = 12.9 g/dL    11.1-15.9                 



             718-7)                                              

 

             Hematocrit (test code = 39.1 %       34.0-46.6                 



             4544-3)                                             

 

             MCV (test code = 787-2) 96 fL        79-97                     

 

             MCH (test code = 785-6) 31.5 pg      26.6-33.0                 

 

             MCHC (test code = 786-4) 33.0 g/dL    31.5-35.7                 

 

             RDW (test code = 788-0) 13.0 %       12.3-15.4                 

 

             Platelets (test code = 777-3) 361 {x10E3/uL} 150-450               

    

 

             Neutrophils (test code = 88 %         Not Estab.                



             770-8)                                              

 

             Lymphs (test code = 736-9) 10 %         Not Estab.                

 

             Monocytes (test code = 2 %          Not Estab.                



             5905-5)                                             

 

             Eos (test code = 713-8) 0 %          Not Estab.                

 

             Basos (test code = 706-2) 0 %          Not Estab.                

 

             Immature Cells (test code = See Comment                            



             Immature Cells)                                        

 

             Neutrophils (Absolute); Above 7.2 {x10E3/uL} 1.4-7.0               

    



             High Threshold (test code =                                        



             751-8)                                              

 

             Lymphs (Absolute) (test code 0.8 {x10E3/uL} 0.7-3.1                

   



             = 731-0)                                            

 

             Monocytes(Absolute) (test 0.2 {x10E3/uL} 0.1-0.9                   



             code = 742-7)                                        

 

             Eos (Absolute) (test code = 0.0 {x10E3/uL} 0.0-0.4                 

  



             711-2)                                              

 

             Baso (Absolute) (test code = 0.0 {x10E3/uL} 0.0-0.2                

   



             704-7)                                              

 

             Immature Granulocytes (test 0 %          Not Estab.                



             code = 13918-6)                                        

 

             Immature Grans (Abs) (test 0.0 {x10E3/uL} 0.0-0.1                  

 



             code = 81979-9)                                        

 

             NRBC (test code = 02696-1) See Comment                            

 

             Hematology Comments: (test See Comment                            



             code = 15559-6)                                        



[facility]] SED RATE BY MODIFIED [id number] 13:35:00





             Test Item    Value        Reference Range Interpretation Comments

 

             Sedimentation Rate-Candy 29 {mm/hr}   0-32                    

  



             (test code = 4537-7)                                        



[facility]] Hepatic Function Panel (7)2019 13:35:00





             Test Item    Value        Reference Range Interpretation Comments

 

             Protein, Total (test code = 7.3 g/dL     6.0-8.5                   



             2885-2)                                             

 

             Albumin (test code = 1751-7) 4.0 g/dL     3.5-5.5                  

 

 

             Bilirubin, Total (test code = 0.3 mg/dL    0.0-1.2                 

  



             -2)                                             

 

             Bilirubin, Direct (test code = 0.09 mg/dL   0.00-0.40              

   



             -7)                                             

 

             Alkaline Phosphatase (test code = 59 {IU/L}                  

      



             6768-6)                                             

 

             AST (SGOT) (test code = 1920-8) 25 {IU/L}    0-40                  

    

 

             ALT (SGPT) (test code = 1742-6) 24 {IU/L}    0-32                  

    



[facility]] CREATININE [id number] 13:35:00





             Test Item    Value        Reference Range Interpretation Comments

 

             Creatinine (test code = 0.71 mg/dL   0.57-1.00                 



             2160-0)                                             

 

             eGFR If NonAfricn Am (test 102 mL/min/1.7 >59                      

 



             code = 48703-6)                                        

 

             eGFR If Africn Am (test code = 117 mL/min/1.7 >59                  

     



             86548-7)                                            



[facility]] COMPLEMENT COMPONENT [id number] 13:35:00





             Test Item    Value        Reference Range Interpretation Comments

 

             Complement C4, Serum; Below Low 12 mg/dL     14-44                 

    



             Threshold (test code = 4498-2)                                     

   



[facility]] COMPLEMENT COMPONENT [id number] 13:35:00





             Test Item    Value        Reference Range Interpretation Comments

 

             Complement C3, Serum (test code = 93 mg/dL                   

      



             4485-9)                                             



[facility]] DNA (DS) [id number] 13:35:00





             Test Item    Value        Reference Range Interpretation Comments

 

             Anti-DNA (DS) Ab Qn; 66 {IU/mL}   0-9                       Negativ

e <5 Equivocal



             Above High Threshold                                        5 - 9 P

ositive  >9



             (test code = 5130-0)                                        



[facility]] C-REACTIVE [id number] 13:35:00





             Test Item    Value        Reference Range Interpretation Comments

 

             C-Reactive Proteint, Quant; Above 14 mg/L      0-10                

      



             High Threshold (test code = 1988-5)                                

        



[facility]] URINALYSIS, COMPLETE W/REFLEX TO [id number] 13:35:00





             Test Item    Value        Reference Range Interpretation Comments

 

             Specific Gravity 1.021        1.005-1.030               



             (test code = 2965-2)                                        

 

             pH (test code = 6.0          5.0-7.5                   



             5803-2)                                             

 

             Urine-Color (test Yellow       Yellow                    



             code = 5778-6)                                        

 

             Appearance (test code Clear        Clear                     



             = 5767-9)                                           

 

             WBC Esterase (test Negative     Negative                  



             code = 5799-2)                                        

 

             Protein (test code = Negative     Negative/Trace              



             81383-7)                                            

 

             Glucose (test code = Negative     Negative                  



             2349-9)                                             

 

             Ketones (test code = Negative     Negative                  



             2514-8)                                             

 

             Occult Blood; 3+           Negative     A            



             Abnormal (test code =                                        



             5794-3)                                             

 

             Bilirubin (test code Negative     Negative                  



             = 5770-3)                                           

 

             Urobilinogen,Semi-Qn 0.2 mg/dL    0.2-1.0                   



             (test code = 06704-9)                                        

 

             Nitrite, Urine (test Negative     Negative                  



             code = 5802-4)                                        

 

             Microscopic  See Comment                            Microscopic was



             Examination (test                                        indicated 

and was



             code = 96466-6)                                        performed.

 

             WBC; Abnormal (test 6-10         0-5          A            



             code = 6690-2)                                        

 

             RBC; Abnormal (test 3-10         0-2          A            



             code = 789-8)                                        

 

             Epithelial Cells (non 0-10         0-10                      



             renal) (test code =                                        



             87364-5)                                            

 

             Epithelial Cells See Comment                            



             (renal) (test code =                                        



             60168-0)                                            

 

             Casts (test code = See Comment                            



             00007-1)                                            

 

             Cast Type (test code See Comment                            



             = 40591-3)                                          

 

             Crystals (test code = See Comment                            



             69089-9)                                            

 

             Crystal Type (test See Comment                            



             code = 5782-8)                                        

 

             Mucus Threads (test Present      Not Estab.                



             code = 22204-5)                                        

 

             Urine Culture, Final report None seen/Few              



             Routine (test code =                                        



             630-4)                                              

 

             Yeast (test code = See Comment                            



             67509-8)                                            

 

             Trichomonas (test See Comment                            



             code = 62283-9)                                        

 

             Comment (test code = See Comment                            



             Comment)                                            

 

             Urinalysis Reflex See Comment                            This speci

men has



             (test code =                                        reflexed to a U

rine



             Urinalysis Reflex)                                        Culture.

 

             Result 1 (test code = No growth                              



             Result 1)                                           



[facility]] CBC (INCLUDES DIFF/PLT)2019 14:17:00





             Test Item    Value        Reference Range Interpretation Comments

 

             WBC (test code = 6690-2) 6.2 {x10E3/uL} 3.4-10.8                  

 

             RBC (test code = 789-8) 4.01 {x10E6/uL} 3.77-5.28                 

 

             Hemoglobin (test code = 12.7 g/dL    11.1-15.9                 



             718-7)                                              

 

             Hematocrit (test code = 38.0 %       34.0-46.6                 



             4544-3)                                             

 

             MCV (test code = 787-2) 95 fL        79-97                     

 

             MCH (test code = 785-6) 31.7 pg      26.6-33.0                 

 

             MCHC (test code = 786-4) 33.4 g/dL    31.5-35.7                 

 

             RDW (test code = 788-0) 13.1 %       12.3-15.4                 

 

             Platelets (test code = 777-3) 277 {x10E3/uL} 150-450               

    

 

             Neutrophils (test code = 70 %         Not Estab.                



             770-8)                                              

 

             Lymphs (test code = 736-9) 23 %         Not Estab.                

 

             Monocytes (test code = 3 %          Not Estab.                



             5905-5)                                             

 

             Eos (test code = 713-8) 4 %          Not Estab.                

 

             Basos (test code = 706-2) 0 %          Not Estab.                

 

             Immature Cells (test code = See Comment                            



             Immature Cells)                                        

 

             Neutrophils (Absolute) (test 4.3 {x10E3/uL} 1.4-7.0                

   



             code = 751-8)                                        

 

             Lymphs (Absolute) (test code 1.4 {x10E3/uL} 0.7-3.1                

   



             = 731-0)                                            

 

             Monocytes(Absolute) (test 0.2 {x10E3/uL} 0.1-0.9                   



             code = 742-7)                                        

 

             Eos (Absolute) (test code = 0.2 {x10E3/uL} 0.0-0.4                 

  



             711-2)                                              

 

             Baso (Absolute) (test code = 0.0 {x10E3/uL} 0.0-0.2                

   



             704-7)                                              

 

             Immature Granulocytes (test 0 %          Not Estab.                



             code = 87433-3)                                        

 

             Immature Grans (Abs) (test 0.0 {x10E3/uL} 0.0-0.1                  

 



             code = 88378-9)                                        

 

             NRBC (test code = 14251-0) See Comment                            

 

             Hematology Comments: (test See Comment                            



             code = 13847-5)                                        



[facility]] URINALYSIS, [id number] 14:17:00





             Test Item    Value        Reference Range Interpretation Comments

 

             Specific Gravity 1.015        1.005-1.030               



             (test code = 2965-2)                                        

 

             pH (test code = 6.0          5.0-7.5                   



             5803-2)                                             

 

             Urine-Color (test Yellow       Yellow                    



             code = 5778-6)                                        

 

             Appearance (test code Clear        Clear                     



             = 5767-9)                                           

 

             WBC Esterase (test Negative     Negative                  



             code = 5799-2)                                        

 

             Protein (test code = Negative     Negative/Trace              



             27185-6)                                            

 

             Glucose (test code = Negative     Negative                  



             2349-9)                                             

 

             Ketones (test code = Negative     Negative                  



             2514-8)                                             

 

             Occult Blood (test Negative     Negative                  



             code = 5794-3)                                        

 

             Bilirubin (test code Negative     Negative                  



             = 5770-3)                                           

 

             Urobilinogen,Semi-Qn 0.2 mg/dL    0.2-1.0                   



             (test code = 62167-7)                                        

 

             Nitrite, Urine (test Negative     Negative                  



             code = 5802-4)                                        

 

             Microscopic  See Comment                            Microscopic not



             Examination (test                                        indicated 

and not



             code = 32661-0)                                        performed.



[facility][L] Hepatic Function Panel [id number] 14:17:00





             Test Item    Value        Reference Range Interpretation Comments

 

             Bilirubin, Direct (test code = 0.12 mg/dL   0.00-0.40              

   



             -7)                                             

 

             Protein, Total (test code = 7.1 g/dL     6.0-8.5                   



             2885-2)                                             

 

             Albumin (test code = 1751-7) 4.1 g/dL     3.5-5.5                  

 

 

             Bilirubin, Total (test code = 0.3 mg/dL    0.0-1.2                 

  



             -2)                                             

 

             Alkaline Phosphatase (test code = 54 {IU/L}                  

      



             6768-6)                                             

 

             AST (SGOT) (test code = 1920-8) 23 {IU/L}    0-40                  

    

 

             ALT (SGPT) (test code = 1742-6) 22 {IU/L}    0-32                  

    



[facility][QL] CREATININE [id number] 14:17:00





             Test Item    Value        Reference Range Interpretation Comments

 

             Creatinine (test code = 0.71 mg/dL   0.57-1.00                 



             2160-0)                                             

 

             eGFR If NonAfricn Am (test 102 mL/min/1.7 >59                      

 



             code = 61702-5)                                        

 

             eGFR If Africn Am (test code = 118 mL/min/1.7 >59                  

     



             66461-2)                                            



[facility]] COMPLEMENT COMPONENT [id number] 14:17:00





             Test Item    Value        Reference Range Interpretation Comments

 

             Complement C4, Serum (test code = 14 mg/dL     1444               

      



             4498-2)                                             



[facility]] COMPLEMENT COMPONENT [id number] 14:17:00





             Test Item    Value        Reference Range Interpretation Comments

 

             Complement C3, Serum (test code = 92 mg/dL                   

      



             4485-9)                                             



[facility]] DNA (DS) [id number] 14:17:00





             Test Item    Value        Reference Range Interpretation Comments

 

             Anti-DNA (DS) Ab Qn; 109 {IU/mL}  0-9                       Negativ

e <5 Equivocal



             Above High Threshold                                        5 - 9 P

ositive >9



             (test code = 5130-0)                                        



[facility]] C-REACTIVE [id number] 14:17:00





             Test Item    Value        Reference Range Interpretation Comments

 

             C-Reactive Protein, 6 mg/L       0-10                      **Please

 note reference



             Quant (test code =                                        interval 

change**



             1988)                                             



[facility][QL] SED RATE BY MODIFIED [id number] 14:17:00





             Test Item    Value        Reference Range Interpretation Comments

 

             Sedimentation Rate-Westergren 29 {mm/hr}   0-32                    

  



             (test code = 4537-7)                                        



[facility]

## 2022-11-14 NOTE — RAD REPORT
EXAM DESCRIPTION:  RAD - Chest Single View - 11/14/2022 5:24 am

 

COMPARISON:  None.

 

CLINICAL HISTORY:  [facility] MAIN CHEST PAIN

 

FINDINGS:  A single AP view of the chest demonstrates a normal cardiomediastinal silhouette.

No pneumothorax or pleural effusion. No consolidation or pulmonary edema.

Osseous structures are intact.

 

IMPRESSION:  No acute chest process.

 

Electronically signed by:   Oscar Rothman MD   11/14/2022 6:55 AM CST Workstation: [id number]

 

 

 

Due to temporary technical issues with the PACS/Fluency reporting system, reports are being signed by
 the in house radiologists without review as a courtesy to insure prompt reporting. The interpreting 
radiologist is fully responsible for the content of the report.

## 2022-11-15 NOTE — EKG
Test Date:    2022-11-14               Test Time:    04:21:26

Technician:   KAYCE                                    

                                                     

MEASUREMENT RESULTS:                                       

Intervals:                                           

Rate:         87                                     

OK:           150                                    

QRSD:         86                                     

QT:           384                                    

QTc:          462                                    

Axis:                                                

P:            29                                     

OK:           150                                    

QRS:          -1                                     

T:            23                                     

                                                     

INTERPRETIVE STATEMENTS:                                       

                                                     

Normal sinus rhythm

Normal ECG

No previous ECG available for comparison



Electronically Signed On 11-15-22 08:20:31 CST by Hollis Gates